# Patient Record
Sex: FEMALE
[De-identification: names, ages, dates, MRNs, and addresses within clinical notes are randomized per-mention and may not be internally consistent; named-entity substitution may affect disease eponyms.]

---

## 2017-10-16 ENCOUNTER — HOSPITAL ENCOUNTER (EMERGENCY)
Dept: HOSPITAL 31 - C.ER | Age: 22
LOS: 1 days | Discharge: HOME | End: 2017-10-17
Payer: SELF-PAY

## 2017-10-16 VITALS — OXYGEN SATURATION: 100 %

## 2017-10-16 VITALS — BODY MASS INDEX: 19.5 KG/M2

## 2017-10-16 DIAGNOSIS — N13.30: ICD-10-CM

## 2017-10-16 DIAGNOSIS — R10.31: Primary | ICD-10-CM

## 2017-10-16 LAB
ALBUMIN/GLOB SERPL: 1.3 {RATIO} (ref 1–2.1)
ALP SERPL-CCNC: 55 U/L (ref 38–126)
ALT SERPL-CCNC: 25 U/L (ref 9–52)
APTT BLD: 24 SECONDS (ref 21–34)
AST SERPL-CCNC: 16 U/L (ref 14–36)
BASOPHILS # BLD AUTO: 0 K/UL (ref 0–0.2)
BASOPHILS NFR BLD: 0.2 % (ref 0–2)
BILIRUB SERPL-MCNC: 0.9 MG/DL (ref 0.2–1.3)
BILIRUB UR-MCNC: NEGATIVE MG/DL
BUN SERPL-MCNC: 7 MG/DL (ref 7–17)
CALCIUM SERPL-MCNC: 9.2 MG/DL (ref 8.6–10.4)
CHLORIDE SERPL-SCNC: 95 MMOL/L (ref 98–107)
CO2 SERPL-SCNC: 23 MMOL/L (ref 22–30)
EOSINOPHIL # BLD AUTO: 0 K/UL (ref 0–0.7)
EOSINOPHIL NFR BLD: 0.1 % (ref 0–4)
ERYTHROCYTE [DISTWIDTH] IN BLOOD BY AUTOMATED COUNT: 17.6 % (ref 11.5–14.5)
GLOBULIN SER-MCNC: 3.4 GM/DL (ref 2.2–3.9)
GLUCOSE SERPL-MCNC: 101 MG/DL (ref 65–105)
GLUCOSE UR STRIP-MCNC: NORMAL MG/DL
HCT VFR BLD CALC: 32.4 % (ref 34–47)
INR PPP: 1.2
KETONES UR STRIP-MCNC: NEGATIVE MG/DL
LEUKOCYTE ESTERASE UR-ACNC: (no result) LEU/UL
LG PLATELETS BLD QL SMEAR: PRESENT
LYMPHOCYTES # BLD AUTO: 0.4 K/UL (ref 1–4.3)
LYMPHOCYTES NFR BLD AUTO: 5.7 % (ref 20–40)
MCH RBC QN AUTO: 25.8 PG (ref 27–31)
MCHC RBC AUTO-ENTMCNC: 32.1 G/DL (ref 33–37)
MCV RBC AUTO: 80.3 FL (ref 81–99)
MONOCYTES # BLD: 0.1 K/UL (ref 0–0.8)
MONOCYTES NFR BLD: 1 % (ref 0–10)
NEUTROPHILS NFR BLD AUTO: 82 % (ref 50–75)
NRBC BLD AUTO-RTO: 0 % (ref 0–2)
PH UR STRIP: 6 [PH] (ref 5–8)
PLATELET # BLD: 229 K/UL (ref 130–400)
PMV BLD AUTO: 7.5 FL (ref 7.2–11.7)
POTASSIUM SERPL-SCNC: 3.8 MMOL/L (ref 3.6–5.2)
PROT SERPL-MCNC: 7.8 G/DL (ref 6.3–8.3)
PROT UR STRIP-MCNC: (no result) MG/DL
RBC # UR STRIP: (no result) /UL
RBC #/AREA URNS HPF: 19 /HPF (ref 0–3)
SMUDGE CELLS # BLD: PRESENT 10*3/UL
SODIUM SERPL-SCNC: 132 MMOL/L (ref 132–148)
SP GR UR STRIP: 1.01 (ref 1–1.03)
TOTAL CELLS COUNTED BLD: 100
UROBILINOGEN UR-MCNC: NORMAL MG/DL (ref 0.2–1)
WBC # BLD AUTO: 7.2 K/UL (ref 4.8–10.8)
WBC #/AREA URNS HPF: 239 /HPF (ref 0–5)
WBC CLUMPS # UR AUTO: (no result) /HPF

## 2017-10-16 PROCEDURE — 87086 URINE CULTURE/COLONY COUNT: CPT

## 2017-10-16 PROCEDURE — 96361 HYDRATE IV INFUSION ADD-ON: CPT

## 2017-10-16 PROCEDURE — 96365 THER/PROPH/DIAG IV INF INIT: CPT

## 2017-10-16 PROCEDURE — 85730 THROMBOPLASTIN TIME PARTIAL: CPT

## 2017-10-16 PROCEDURE — 85610 PROTHROMBIN TIME: CPT

## 2017-10-16 PROCEDURE — 74177 CT ABD & PELVIS W/CONTRAST: CPT

## 2017-10-16 PROCEDURE — 84703 CHORIONIC GONADOTROPIN ASSAY: CPT

## 2017-10-16 PROCEDURE — 83690 ASSAY OF LIPASE: CPT

## 2017-10-16 PROCEDURE — 87181 SC STD AGAR DILUTION PER AGT: CPT

## 2017-10-16 PROCEDURE — 81001 URINALYSIS AUTO W/SCOPE: CPT

## 2017-10-16 PROCEDURE — 96375 TX/PRO/DX INJ NEW DRUG ADDON: CPT

## 2017-10-16 PROCEDURE — 85025 COMPLETE CBC W/AUTO DIFF WBC: CPT

## 2017-10-16 PROCEDURE — 99284 EMERGENCY DEPT VISIT MOD MDM: CPT

## 2017-10-16 PROCEDURE — 80053 COMPREHEN METABOLIC PANEL: CPT

## 2017-10-16 NOTE — C.PDOC
History Of Present Illness


22 year old female who presents to the ER with a complaint of a dull RLQ pain 

and right flank pain for the past few days, associated with mild nausea and 1 

episode of vomiting. Patient reports having a fever at home and states she took 

tylenol at home PTA. Denies dysuria or hematuria.


Chief Complaint (Nursing): Fever


History Per: Patient


History/Exam Limitations: no limitations


Onset/Duration Of Symptoms: Days


Current Symptoms Are (Timing): Still Present


Location Of Pain: Other (RLQ, right flank)


Sick Contacts (Context): None


Associated Symptoms: Fever, Nausea, Vomiting


Ear Symptoms: Bilateral: None


Recent travel outside of the [place]: No





Past Medical History


Reviewed: Historical Data, Nursing Documentation, Vital Signs


Vital Signs: 


 Last Vital Signs











Temp  98.9 F   10/17/17 00:41


 


Pulse  105 H  10/17/17 00:41


 


Resp  16   10/17/17 00:41


 


BP  99/65 L  10/17/17 00:41


 


Pulse Ox  100   10/17/17 00:41














- Medical History


PMH: Migraine


Surgical History: No Surg Hx


Family History: States: Unknown Family Hx





- Social History


Hx Tobacco Use: No


Hx Alcohol Use: Yes


Hx Substance Use: No





- Immunization History


Hx Tetanus Toxoid Vaccination: No


Hx Influenza Vaccination: No


Hx Pneumococcal Vaccination: No





Review Of Systems


Constitutional: Positive for: Fever


Gastrointestinal: Positive for: Nausea, Vomiting, Abdominal Pain (RLQ)


Musculoskeletal: Positive for: Back Pain (Right flank)





Physical Exam





- Physical Exam


Appears: Non-toxic


Skin: Normal Color, Warm, Dry


Head: Atraumatic, Normacephalic


Oral Mucosa: Moist


Chest: Symmetrical


Cardiovascular: Rhythm Regular


Respiratory: Normal Breath Sounds, No Rales, No Rhonchi, No Wheezing


Gastrointestinal/Abdominal: Bowel Sounds (Good), Soft, Tenderness (RLQ), No 

Guarding, No Rebound


Back: CVA Tenderness (Right)


Neurological/Psych: Oriented x3, Normal Speech, Normal Cognition





ED Course And Treatment





- Laboratory Results


Result Diagrams: 


 10/16/17 21:25





 10/16/17 21:25


O2 Sat by Pulse Oximetry: 100 (Room air)


Pulse Ox Interpretation: Normal


Progress Note: CT abd/pel, urinalysis, and blood work ordered. IV fluids, 

toradol, and zofran administered.





Disposition





- Disposition


Disposition Time: 01:00


Condition: UNKNOWN


Forms:  [facility] (English)





- Clinical Impression


Clinical Impression: 


 Abdominal pain








- Scribe Statement


The provider has reviewed the documentation as recorded by the Scribe





Sudarshan Ellis





All medical record entries made by the Scribe were at my direction and 

personally dictated by me. I have reviewed the chart and agree that the record 

accurately reflects my personal performance of the history, physical exam, 

medical decision making, and the department course for this patient. I have 

also personally directed, reviewed, and agree with the discharge instructions 

and disposition.

## 2017-10-17 VITALS
HEART RATE: 104 BPM | TEMPERATURE: 98.6 F | SYSTOLIC BLOOD PRESSURE: 97 MMHG | RESPIRATION RATE: 20 BRPM | DIASTOLIC BLOOD PRESSURE: 64 MMHG

## 2017-10-17 NOTE — CT
EXAM:

  CT Abdomen and Pelvis With Intravenous Contrast



CLINICAL HISTORY:

  22 years old, female; Pain; Abdominal pain; Additional info: Rlq pain 

radiating to flank



TECHNIQUE:

  Axial computed tomography images of the abdomen and pelvis with intravenous 

contrast.  All CT scans at this facility use one or more dose reduction 

techniques, viz.: automated exposure control; ma/kV adjustment per patient size 

(including targeted exams where dose is matched to indication; i.e. head); or 

iterative reconstruction technique.

  Coronal and sagittal reformatted images were created and reviewed.



CONTRAST:

  100 mL of [id number] administered intravenously.



COMPARISON:

  No relevant prior studies available.







FINDINGS:

  Lower thorax: The bilateral lung bases are clear. 

  

 ABDOMEN:

  Liver: The liver is enlarged, and demonstrates diffuse fatty infiltration.

  Gallbladder and bile ducts: The gallbladder is decompressed.  No calcified 

stones.  No significant intra- or extrahepatic biliary ductal dilation.

  Pancreas: Enhances homogeneously. No ductal dilation. No discrete mass.

  Spleen: No acute findings.

  Adrenals: No acute findings.

  Kidneys and ureters: Mild right-sided hydroureteronephrosis without a 

discrete obstructing stone identified. No discrete solid mass.



 PELVIS:

  Bladder: The bladder is moderately distended.

  Reproductive: The uterus is anteverted and anteflexed, and otherwise 

unremarkable.

  Appendix: The air filled appendix is of normal caliber (series 3, image 124; 

series 601, image 35) .



 ABDOMEN and PELVIS:

  Stomach and bowel: Oral contrast extends to the level of the colon, without 

obstruction. Mural thickening within multiple loops of minimally dilated small 

bowel, without surrounding inflammation or fluid to confirm acute enteritis.

  Peritoneum: As above.

  Lymph nodes: No pathologically enlarged lymph nodes.

  Vasculature:  Unremarkable.

  Bones:  No acute fracture.



IMPRESSION:     

  

Mild right-sided hydroureteronephrosis without a discrete obstructing stone 

identified.

Mural thickening within multiple loops of minimally dilated small bowel, 

without surrounding inflammation or fluid to confirm acute enteritis.

Normal appendix.

Normal gallbladder.

## 2018-03-31 ENCOUNTER — HOSPITAL ENCOUNTER (EMERGENCY)
Dept: HOSPITAL 31 - C.ER | Age: 23
Discharge: HOME | End: 2018-03-31
Payer: SELF-PAY

## 2018-03-31 VITALS — HEART RATE: 81 BPM | SYSTOLIC BLOOD PRESSURE: 102 MMHG | DIASTOLIC BLOOD PRESSURE: 57 MMHG | RESPIRATION RATE: 18 BRPM

## 2018-03-31 VITALS — OXYGEN SATURATION: 100 % | TEMPERATURE: 98.2 F

## 2018-03-31 VITALS — BODY MASS INDEX: 19.5 KG/M2
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
## 2018-03-31 DIAGNOSIS — S90.812A: Primary | ICD-10-CM

## 2018-03-31 NOTE — C.PDOC
History Of Present Illness


22 years old female presents to ED with complaints of laceration to left foot 

PTA. Patient states she stepped on glass barefoot. Denies weakness, numbness or 

any other physical complaints.  (Usha Thomas)


History Per: Patient


History/Exam Limitations: no limitations


Onset/Duration Of Symptoms: Hrs


Current Symptoms Are (Timing): Still Present


Location Of Injury: Left: Foot


Quality Of Symptoms: Painful


Recent travel outside of the [place]: No





- Animal Bite


Description Of The Attack: denies: Unprovoked Attack


Time Seen by Provider: 03/31/18 03:12


Chief Complaint (Nursing): Abnormal Skin Integrity





Past Medical History


Reviewed: Historical Data, Nursing Documentation, Vital Signs





- Medical History


PMH: Migraine


Surgical History: No Surg Hx


Family History: States: Unknown Family Hx





- Social History


Hx Tobacco Use: No


Hx Alcohol Use: Yes


Hx Substance Use: No





- Immunization History


Hx Tetanus Toxoid Vaccination: No


Hx Influenza Vaccination: No


Hx Pneumococcal Vaccination: No


Vital Signs: 





 Last Vital Signs











Temp  98.2 F   03/31/18 03:09


 


Pulse  81   03/31/18 04:35


 


Resp  18   03/31/18 04:35


 


BP  102/57 L  03/31/18 04:35


 


Pulse Ox  100   03/31/18 04:56














Review Of Systems


Constitutional: Negative for: Fever, Chills


Gastrointestinal: Negative for: Nausea, Vomiting, Diarrhea


Skin: Positive for: Other (Laceration to left foot).  Negative for: Rash


Neurological: Negative for: Weakness, Numbness





Physical Exam





- Physical Exam


Appears: Well, Non-toxic, No Acute Distress


Skin: Warm, Dry


Head: Atraumatic, Normacephalic


Eye(s): bilateral: Normal Inspection, PERRL, EOMI


Extremity: Normal ROM, No Tenderness, Capillary Refill (<  2 sec), No Deformity

, No Swelling, No Other (no visualized FB, 4 cm superficial linear laceration 

of plantar aspect of left foot -medially)


Extremity: Bilateral: Normal Color And Temperature, Normal ROM


Pulses: Left Dorsalis Pedis: Normal


Neurological/Psych: Oriented x3, Normal Motor, Normal Sensation


Gait: Unable To Assess





ED Course And Treatment


O2 Sat by Pulse Oximetry: 100 (RA)


Pulse Ox Interpretation: Normal





- Other Rad


  ** Lt foot 


X-Ray: Interpreted by Me, Viewed By Me


Interpretation: no fx , or FB


Progress Note: Administered Motrin.  Ordered Left Foot X-Ray.  Wound cleansed 

and closed with dermabond. Bulking dressing placed and pt given ortho shoe for 

upport. Ambulatory with mild limp





Laceration





- Laceration Repair


  ** Lt foot


Wound Length (In cm): 4cm 


Description Of Wound: Linear, Clean


Wound Examination: Irrigated With Saline (vigorous ), No FB With Wound 

Exploration, No Tendon Injury With Wound Exploration


Wound Closure: Skin Glue (dermabond)


Wound Complexity: Simple (bulky dressing applied)





Disposition


Counseled Patient/Family Regarding: Diagnosis, Need For Followup, Rx Given





- Disposition


Disposition Time: 04:18





- Disposition


Referrals: 


[facility] [Outside]


Disposition: HOME/ ROUTINE


Condition: STABLE


Additional Instructions: 


Keep foot clean/ apply bacitracin ointment 





tylenol or advil for pain





Return to ER if worse 








Instructions:  Skin Abrasions (DC)


Forms:  [facility] (English)


Print Language: [place]





- Clinical Impression


Clinical Impression: 


 Abrasion, foot








- PA / NP / Resident Statement


MD/DO has reviewed & agrees with the documentation as recorded.





- Scribe Statement


The provider has reviewed the documentation as recorded by the Scribe





- Scribe Statement





Santa Nunez





All medical record entries made by the Scribe were at my direction and 

personally dictated by me. I have reviewed the chart and agree that the record 

accurately reflects my personal performance of the history, physical exam, 

medical decision making, and the department course for this patient. I have 

also personally directed, reviewed, and agree with the discharge instructions 

and disposition. (Usha Thomas)

## 2018-03-31 NOTE — RAD
PROCEDURE:  Left foot dated 03/31/2018 



HISTORY:

laceration, stepped on glass  



COMPARISON:

Comparison made with radiographs left foot 03/12/2017



FINDINGS:



BONES:

Normal. No fracture. 



JOINTS:

Normal. 



SOFT TISSUES:

Normal. 



OTHER FINDINGS:

No radiopaque foreign bodies are identified



IMPRESSION:

No acute displaced fracture nor dislocation.  No radiopaque foreign 

bodies.

## 2019-02-05 ENCOUNTER — HOSPITAL ENCOUNTER (EMERGENCY)
Dept: HOSPITAL 31 - C.ER | Age: 24
Discharge: HOME | End: 2019-02-05
Payer: COMMERCIAL

## 2019-02-05 VITALS — OXYGEN SATURATION: 99 % | RESPIRATION RATE: 17 BRPM

## 2019-02-05 VITALS — BODY MASS INDEX: 19.5 KG/M2

## 2019-02-05 VITALS — TEMPERATURE: 97.8 F | SYSTOLIC BLOOD PRESSURE: 101 MMHG | HEART RATE: 72 BPM | DIASTOLIC BLOOD PRESSURE: 69 MMHG

## 2019-02-05 DIAGNOSIS — R10.9: Primary | ICD-10-CM

## 2019-02-05 LAB
ALBUMIN SERPL-MCNC: 4.8 G/DL (ref 3.5–5)
ALBUMIN/GLOB SERPL: 1.9 {RATIO} (ref 1–2.1)
ALT SERPL-CCNC: 11 U/L (ref 9–52)
AST SERPL-CCNC: 17 U/L (ref 14–36)
BASOPHILS # BLD AUTO: 0 K/UL (ref 0–0.2)
BASOPHILS NFR BLD: 0.3 % (ref 0–2)
BILIRUB UR-MCNC: NEGATIVE MG/DL
BUN SERPL-MCNC: 11 MG/DL (ref 7–17)
CALCIUM SERPL-MCNC: 9.8 MG/DL (ref 8.6–10.4)
EOSINOPHIL # BLD AUTO: 0 K/UL (ref 0–0.7)
EOSINOPHIL NFR BLD: 0.5 % (ref 0–4)
ERYTHROCYTE [DISTWIDTH] IN BLOOD BY AUTOMATED COUNT: 13.1 % (ref 11.5–14.5)
GFR NON-AFRICAN AMERICAN: > 60
GLUCOSE UR STRIP-MCNC: NORMAL MG/DL
HGB BLD-MCNC: 13.6 G/DL (ref 11–16)
LEUKOCYTE ESTERASE UR-ACNC: (no result) LEU/UL
LYMPHOCYTES # BLD AUTO: 2.4 K/UL (ref 1–4.3)
LYMPHOCYTES NFR BLD AUTO: 37.9 % (ref 20–40)
MCH RBC QN AUTO: 32.2 PG (ref 27–31)
MCHC RBC AUTO-ENTMCNC: 34.1 G/DL (ref 33–37)
MCV RBC AUTO: 94.5 FL (ref 81–99)
MONOCYTES # BLD: 0.4 K/UL (ref 0–0.8)
MONOCYTES NFR BLD: 6.8 % (ref 0–10)
NEUTROPHILS # BLD: 3.5 K/UL (ref 1.8–7)
NEUTROPHILS NFR BLD AUTO: 54.5 % (ref 50–75)
NRBC BLD AUTO-RTO: 0 % (ref 0–2)
PH UR STRIP: 5 [PH] (ref 5–8)
PLATELET # BLD: 264 K/UL (ref 130–400)
PMV BLD AUTO: 8.2 FL (ref 7.2–11.7)
PROT UR STRIP-MCNC: NEGATIVE MG/DL
RBC # BLD AUTO: 4.21 MIL/UL (ref 3.8–5.2)
RBC # UR STRIP: NEGATIVE /UL
SP GR UR STRIP: 1.02 (ref 1–1.03)
SQUAMOUS EPITHIAL: 3 /HPF (ref 0–5)
UROBILINOGEN UR-MCNC: NORMAL MG/DL (ref 0.2–1)
WBC # BLD AUTO: 6.5 K/UL (ref 4.8–10.8)

## 2019-02-05 PROCEDURE — 74176 CT ABD & PELVIS W/O CONTRAST: CPT

## 2019-02-05 PROCEDURE — 96374 THER/PROPH/DIAG INJ IV PUSH: CPT

## 2019-02-05 PROCEDURE — 80053 COMPREHEN METABOLIC PANEL: CPT

## 2019-02-05 PROCEDURE — 99285 EMERGENCY DEPT VISIT HI MDM: CPT

## 2019-02-05 PROCEDURE — 81001 URINALYSIS AUTO W/SCOPE: CPT

## 2019-02-05 PROCEDURE — 81025 URINE PREGNANCY TEST: CPT

## 2019-02-05 PROCEDURE — 85025 COMPLETE CBC W/AUTO DIFF WBC: CPT

## 2019-02-05 NOTE — CT
PROCEDURE:  CT Abdomen and Pelvis without Oral or IV contrast.



HISTORY:

Hydroureteronephrosis



COMPARISON:

Contrast enhanced CT of the abdomen and pelvis performed 10/16/17



TECHNIQUE:

Contiguous axial images of the abdomen and pelvis. No oral or IV 

contrast administered. Coronal and Sagittal reformats generated and 

reviewed. 



Radiation dose:



Total exam DLP = 230.01 mGy-cm.



This CT exam was performed using one or more of the following dose 

reduction techniques: Automated exposure control, adjustment of the 

mA and/or kV according to patient size, and/or use of iterative 

reconstruction technique.



FINDINGS:

There is limited evaluation of the solid organs without the 

administration of IV contrast.



LOWER THORAX:

No visible consolidation, pleural effusion, or pneumothorax.



LIVER:

Unremarkable unenhanced appearance. 



GALLBLADDER AND BILE DUCTS:

Unremarkable unenhanced appearance. 



PANCREAS:

Unremarkable unenhanced appearance. 



SPLEEN:

Unremarkable unenhanced appearance. 



ADRENALS:

Unremarkable unenhanced appearance. 



KIDNEYS AND URETERS:

No hydronephrosis or obstructing renal calculus.



BLADDER:

The urinary bladder appears unremarkable.



REPRODUCTIVE:

Uterus is present. Small pelvic fluid. 



APPENDIX:

The appendix is not identified. No secondary signs of acute 

appendicitis.



BOWEL:

The stomach is nondistended. 



Lack of oral contrast limits evaluation for bowel pathology.   The 

bowel loops appear within normal limits of caliber without evidence 

of intestinal obstruction.



PERITONEUM:

No significant free fluid. No definite free air.



LYMPH NODES:

No bulky lymphadenopathy identified.



VASCULATURE:

No aortic calcifications identified. No aortic aneurysm. 



BONES:

No acute osseous abnormality is detected.



OTHER FINDINGS:

None. 



IMPRESSION:

Small pelvic free fluid. 



The appendix is not identified. No secondary signs of acute 

appendicitis appreciated. 



No evidence of hydronephrosis or obstructing calculus.

## 2019-02-05 NOTE — C.PDOC
History Of Present Illness





22 y/o female with history of Hydrouteronephrosis in 2017  presents to ED for 

medical evaluation of left sided abdominal pain.  She states that it is the same

type of pain she experienced in 2017.  She describes it as a sharp pain r

adiating to the flank area.  She also complaining of increase frequency, 

dysuria, and chills.  She denies fever, sweating, nausea, vomiting, chest pain, 

SOB, constipation, and vaginal discharge.  Her LMP was 11/10/18 but admits that 

her periods are irregular.  Last bowel movement was last night. She denies any 

use of meds for pain relief. 


Time Seen by Provider: 02/05/19 09:26


Chief Complaint (Nursing): Abdominal Pain


History Per: Patient


History/Exam Limitations: no limitations


Onset/Duration Of Symptoms: Days (1), Sudden Onset


Current Symptoms Are (Timing): Still Present





Past Medical History


Reviewed: Historical Data, Nursing Documentation, Vital Signs


Vital Signs: 





                                Last Vital Signs











Temp  98.5 F   02/05/19 09:21


 


Pulse  87   02/05/19 09:21


 


Resp  17   02/05/19 09:21


 


BP  116/80   02/05/19 09:21


 


Pulse Ox  99   02/05/19 09:21














- Medical History


PMH: Migraine


   Denies: Chronic Kidney Disease


Other PMH: hydrouteronephrosis


Surgical History: Tonsillectomy


Family History: States: Unknown Family Hx





- Social History


Hx Tobacco Use: No


Hx Alcohol Use: Yes


Hx Substance Use: No





- Immunization History


Hx Tetanus Toxoid Vaccination: No


Hx Influenza Vaccination: No


Hx Pneumococcal Vaccination: No





Review Of Systems


Constitutional: Positive for: Chills.  Negative for: Fever, Sweats, Weakness


Cardiovascular: Negative for: Chest Pain


Respiratory: Negative for: Shortness of Breath


Gastrointestinal: Positive for: Abdominal Pain.  Negative for: Nausea, Vomiting,

Diarrhea


Genitourinary: Positive for: Dysuria, Frequency


Musculoskeletal: Positive for: Back Pain


Skin: Negative for: Rash


Neurological: Negative for: Headache, Dizziness





Physical Exam





- Physical Exam


Appears: Well, Non-toxic, No Acute Distress


Skin: Normal Color, Warm, Dry


Head: Atraumatic, Normacephalic, No Tenderness


Eye(s): bilateral: Normal Inspection


Nose: Flaring


Neck: Normal ROM, Supple


Chest: Symmetrical


Cardiovascular: Rhythm Regular


Respiratory: Normal Breath Sounds, No Wheezing


Gastrointestinal/Abdominal: Bowel Sounds, Soft, Tenderness (diffuse left sided; 

slight tenderness to palpation of sprapubic region), No Guarding


Back: CVA Tenderness (slight; left sided)


Neurological/Psych: Oriented x3, Normal Speech, Normal Sensation





ED Course And Treatment





- Laboratory Results


Result Diagrams: 


                                 02/05/19 11:34





                                 02/05/19 11:34


O2 Sat by Pulse Oximetry: 99





Medical Decision Making


Medical Decision Making: 





A/P:1. Abdominal pain; left sided


   2. Small pelvic fluid noted on CT


   - POC urine preg -negative


   - Toradal IV given with some relief


   - Labs, UA, and CT reviewed and discussed with patient


   - start Naproxen 500mg BID prn PAIN


   - follow up with GYN to further assess CT findings 


   - Return if symptoms persist or worsen


   - patient verbalized understanding 





Disposition


Counseled Patient/Family Regarding: Studies Performed, Diagnosis, Need For 

Followup, Rx Given





- Disposition


Referrals: 


 at [facility] [Outside]


[facility] [Outside]


Disposition: HOME/ ROUTINE


Disposition Time: 15:42


Condition: IMPROVED


Additional Instructions: 





KAYCE KO, thank you for letting us take care of you today. Your 

provider was Usha Feng MD/Sherryleen Elisca PA-C and you were treated for 

LT SIDE PAIN. The emergency medical care you received today was directed at your

 acute symptoms. If you were prescribed any medication, please fill it and take 

as directed. It may take several days for your symptoms to resolve. Return to 

the Emergency Department if your symptoms worsen, do not improve, or if you have

 any other problems.





Please contact your doctor or call one of the physicians/clinics you have been 

referred to that are listed on the Patient Visit Information form that is 

included in your discharge packet. Bring any paperwork you were given at 

discharge with you along with any medications you are taking to your follow up 

visit. Our treatment cannot replace ongoing medical care by a primary care 

provider outside of the emergency department.





Thank you for allowing the [facility] team to be part of your care today.








Prescriptions: 


Naproxen 500 mg PO BID PRN #30 tab


 PRN Reason: Pain, Moderate (4-7)


Instructions:  Acute Abdomen (Belly Pain), Adult (DC)


Forms:  [facility] (English)





- Clinical Impression


Clinical Impression: 


 Abdominal pain, Pelvic fluid collection